# Patient Record
Sex: FEMALE | Race: WHITE
[De-identification: names, ages, dates, MRNs, and addresses within clinical notes are randomized per-mention and may not be internally consistent; named-entity substitution may affect disease eponyms.]

---

## 2020-10-27 ENCOUNTER — HOSPITAL ENCOUNTER (EMERGENCY)
Dept: HOSPITAL 96 - M.ERS | Age: 55
Discharge: HOME | End: 2020-10-27
Payer: COMMERCIAL

## 2020-10-27 VITALS — BODY MASS INDEX: 30.31 KG/M2 | WEIGHT: 200 LBS | HEIGHT: 68 IN

## 2020-10-27 VITALS — DIASTOLIC BLOOD PRESSURE: 78 MMHG | SYSTOLIC BLOOD PRESSURE: 154 MMHG

## 2020-10-27 DIAGNOSIS — Z88.1: ICD-10-CM

## 2020-10-27 DIAGNOSIS — Z88.8: ICD-10-CM

## 2020-10-27 DIAGNOSIS — Z90.710: ICD-10-CM

## 2020-10-27 DIAGNOSIS — H81.10: Primary | ICD-10-CM

## 2020-10-27 LAB
ABSOLUTE BASOPHILS: 0.1 THOU/UL (ref 0–0.2)
ABSOLUTE EOSINOPHILS: 0 THOU/UL (ref 0–0.7)
ABSOLUTE MONOCYTES: 0.4 THOU/UL (ref 0–1.2)
ALBUMIN SERPL-MCNC: 4.2 G/DL (ref 3.4–5)
ALP SERPL-CCNC: 62 U/L (ref 46–116)
ALT SERPL-CCNC: 45 U/L (ref 30–65)
ANION GAP SERPL CALC-SCNC: 7 MMOL/L (ref 7–16)
AST SERPL-CCNC: 33 U/L (ref 15–37)
BASOPHILS NFR BLD AUTO: 1.4 %
BILIRUB SERPL-MCNC: 0.3 MG/DL
BUN SERPL-MCNC: 6 MG/DL (ref 7–18)
CALCIUM SERPL-MCNC: 9.4 MG/DL (ref 8.5–10.1)
CHLORIDE SERPL-SCNC: 101 MMOL/L (ref 98–107)
CO2 SERPL-SCNC: 30 MMOL/L (ref 21–32)
CREAT SERPL-MCNC: 0.9 MG/DL (ref 0.6–1.3)
EOSINOPHIL NFR BLD: 0.6 %
GLUCOSE SERPL-MCNC: 97 MG/DL (ref 70–99)
GRANULOCYTES NFR BLD MANUAL: 70.1 %
HCT VFR BLD CALC: 39.9 % (ref 37–47)
HGB BLD-MCNC: 12.8 GM/DL (ref 12–15)
LYMPHOCYTES # BLD: 0.8 THOU/UL (ref 0.8–5.3)
LYMPHOCYTES NFR BLD AUTO: 18.6 %
MCH RBC QN AUTO: 27 PG (ref 26–34)
MCHC RBC AUTO-ENTMCNC: 32.2 G/DL (ref 28–37)
MCV RBC: 83.9 FL (ref 80–100)
MONOCYTES NFR BLD: 9.3 %
MPV: 7 FL. (ref 7.2–11.1)
NEUTROPHILS # BLD: 3.2 THOU/UL (ref 1.6–8.1)
NUCLEATED RBCS: 0 /100WBC
PLATELET COUNT*: 278 THOU/UL (ref 150–400)
POTASSIUM SERPL-SCNC: 4.5 MMOL/L (ref 3.5–5.1)
PROT SERPL-MCNC: 7.5 G/DL (ref 6.4–8.2)
RBC # BLD AUTO: 4.75 MIL/UL (ref 4.2–5)
RDW-CV: 15.6 % (ref 10.5–14.5)
SODIUM SERPL-SCNC: 138 MMOL/L (ref 136–145)
WBC # BLD AUTO: 4.5 THOU/UL (ref 4–11)

## 2020-10-28 NOTE — [place] [address]
Phone:  [phone]                     ELECTROCARDIOGRAM REPORT      
_______________________________________________________________________________
 
Name:         JES COLON          Room:                     [facility]#:    [id number]     Account #:     [id number]  
Admission:    10/27/20    Attend Phys:                     
Discharge:    10/27/20    Date of Birth: [birth year]  
Date of Service: 10/27/20 1107  Report #:      [id number]
        [id number]
_______________________________________________________________________________
THIS REPORT FOR:  //name//                      
 
                         [facility] ED
                                       
Test Date:    2020-10-27               Test Time:    11:07:15
Pat Name:     JES COLON           Department:   
Patient ID:   [id number]            Room:          
Gender:                               Technician:   TDS
:          1965               Requested By: Bishop Kunz
Order Number: [id number] MD:   Immanuel Gallardo
                                 Measurements
Intervals                              Axis          
Rate:         62                       P:            34
MA:           140                      QRS:          -3
QRSD:         104                      T:            46
QT:           438                                    
QTc:          445                                    
                           Interpretive Statements
Sinus rhythm
Compared to ECG 2008 05:59:26
No significant changes
Electronically Signed On 10- 13:42:16 CDT by Immanuel Gallardo
[email or web address]
 
 
 
 
 
 
 
 
 
 
 
 
 
 
 
 
 
 
 
 
 
  <ELECTRONICALLY SIGNED>
                                           By: Immanuel Gallardo MD, [facility]     
  10/28/20     1342
D: 10/27/20 1107   _____________________________________
T: 10/27/20 1107   Immanuel Gallardo MD, FAC       /EPI